# Patient Record
Sex: MALE | Race: WHITE
[De-identification: names, ages, dates, MRNs, and addresses within clinical notes are randomized per-mention and may not be internally consistent; named-entity substitution may affect disease eponyms.]

---

## 2017-01-05 ENCOUNTER — HOSPITAL ENCOUNTER (OUTPATIENT)
Age: 67
Discharge: HOME | End: 2017-01-05
Payer: COMMERCIAL

## 2017-01-05 DIAGNOSIS — R94.31: Primary | ICD-10-CM

## 2017-01-05 PROCEDURE — 78452 HT MUSCLE IMAGE SPECT MULT: CPT

## 2017-01-05 PROCEDURE — 93017 CV STRESS TEST TRACING ONLY: CPT

## 2017-01-10 ENCOUNTER — HOSPITAL ENCOUNTER (OUTPATIENT)
Age: 67
Discharge: HOME | End: 2017-01-10
Payer: COMMERCIAL

## 2017-01-10 DIAGNOSIS — Z20.5: ICD-10-CM

## 2017-01-10 DIAGNOSIS — E78.5: Primary | ICD-10-CM

## 2017-03-13 ENCOUNTER — HOSPITAL ENCOUNTER (OUTPATIENT)
Dept: HOSPITAL 76 - DI.S | Age: 67
Discharge: HOME | End: 2017-03-13
Attending: PHYSICIAN ASSISTANT
Payer: COMMERCIAL

## 2017-03-13 DIAGNOSIS — S22.42XA: Primary | ICD-10-CM

## 2017-03-13 PROCEDURE — 71020: CPT

## 2017-03-13 NOTE — XRAY REPORT
LEFT RIBS: 03/13/2017

 

CLINICAL INDICATION: Fall, pain. 

 

FINDINGS:  Oblique views of the left ribs demonstrate mildly displaced fractures of the anterior left
 6th and 7th ribs. No pneumothorax is evident. 

 

IMPRESSION:  MILDLY DISPLACED LEFT ANTERIOR 6TH AND 7TH RIB FRACTURES.

 

 

 

DD:03/13/2017 15:24:06  DT: 03/13/2017 15:29  JOB #: [id number]  EXT JOB #:[id number]

## 2017-03-14 NOTE — XRAY REPORT
TWO-VIEW CHEST:  03/13/2017

 

CLINICAL INDICATION:  Chest pain status post fall.

 

COMPARISON:  02/14/2015 

 

FINDINGS:  Frontal and lateral views of the chest demonstrate an enlarged cardiac silhouette.  Left b
asilar atelectasis is present.  No effusion or pneumothorax is seen.  Please also refer to left rib f
ilms of the same day. 

 

IMPRESSION:  LEFT BASILAR ATELECTASIS.  NO EVIDENCE OF PNEUMOTHORAX. 

 

 

 

DD:03/13/2017 15:22:55  DT: 03/13/2017 15:29  JOB #: [id number]  EXT JOB #:[id number]

## 2017-04-11 ENCOUNTER — HOSPITAL ENCOUNTER (OUTPATIENT)
Age: 67
Discharge: HOME | End: 2017-04-11
Payer: COMMERCIAL

## 2017-04-11 DIAGNOSIS — E78.00: Primary | ICD-10-CM

## 2018-05-30 ENCOUNTER — HOSPITAL ENCOUNTER (OUTPATIENT)
Dept: HOSPITAL 76 - LAB.F | Age: 68
Discharge: HOME | End: 2018-05-30
Attending: INTERNAL MEDICINE
Payer: MEDICARE

## 2018-05-30 DIAGNOSIS — E78.5: Primary | ICD-10-CM

## 2018-05-30 DIAGNOSIS — M10.9: ICD-10-CM

## 2018-05-30 LAB
ALBUMIN DIAFP-MCNC: 4.2 G/DL (ref 3.2–5.5)
ALBUMIN/GLOB SERPL: 1.4 {RATIO} (ref 1–2.2)
ALP SERPL-CCNC: 67 IU/L (ref 42–121)
ALT SERPL W P-5'-P-CCNC: 38 IU/L (ref 10–60)
ANION GAP SERPL CALCULATED.4IONS-SCNC: 7 MMOL/L (ref 6–13)
AST SERPL W P-5'-P-CCNC: 27 IU/L (ref 10–42)
BILIRUB BLD-MCNC: 1 MG/DL (ref 0.2–1)
BUN SERPL-MCNC: 18 MG/DL (ref 6–20)
CALCIUM UR-MCNC: 9.2 MG/DL (ref 8.5–10.3)
CHLORIDE SERPL-SCNC: 107 MMOL/L (ref 101–111)
CHOLEST SERPL-MCNC: 165 MG/DL
CO2 SERPL-SCNC: 23 MMOL/L (ref 21–32)
CREAT SERPLBLD-SCNC: 0.9 MG/DL (ref 0.6–1.2)
GFRSERPLBLD MDRD-ARVRAT: 84 ML/MIN/{1.73_M2} (ref 89–?)
GLOBULIN SER-MCNC: 3 G/DL (ref 2.1–4.2)
GLUCOSE SERPL-MCNC: 113 MG/DL (ref 70–100)
HDLC SERPL-MCNC: 41 MG/DL
HDLC SERPL: 4 {RATIO} (ref ?–5)
LDLC SERPL CALC-MCNC: 108 MG/DL
LDLC/HDLC SERPL: 2.6 {RATIO} (ref ?–3.6)
PROT SPEC-MCNC: 7.2 G/DL (ref 6.7–8.2)
SODIUM SERPLBLD-SCNC: 137 MMOL/L (ref 135–145)
URATE SERPL-MCNC: 7.9 MG/DL (ref 2.6–7.2)
VLDLC SERPL-SCNC: 16 MG/DL

## 2018-05-30 PROCEDURE — 83721 ASSAY OF BLOOD LIPOPROTEIN: CPT

## 2018-05-30 PROCEDURE — 80053 COMPREHEN METABOLIC PANEL: CPT

## 2018-05-30 PROCEDURE — 84550 ASSAY OF BLOOD/URIC ACID: CPT

## 2018-05-30 PROCEDURE — 80061 LIPID PANEL: CPT

## 2018-05-30 PROCEDURE — 36415 COLL VENOUS BLD VENIPUNCTURE: CPT

## 2018-12-07 ENCOUNTER — HOSPITAL ENCOUNTER (OUTPATIENT)
Dept: HOSPITAL 76 - LAB.F | Age: 68
Discharge: HOME | End: 2018-12-07
Attending: PHYSICIAN ASSISTANT
Payer: MEDICARE

## 2018-12-07 DIAGNOSIS — E78.5: Primary | ICD-10-CM

## 2018-12-07 DIAGNOSIS — R73.01: ICD-10-CM

## 2018-12-07 DIAGNOSIS — I10: ICD-10-CM

## 2018-12-07 LAB
ALBUMIN DIAFP-MCNC: 4.3 G/DL (ref 3.2–5.5)
ALBUMIN/GLOB SERPL: 1.3 {RATIO} (ref 1–2.2)
ALP SERPL-CCNC: 66 IU/L (ref 42–121)
ALT SERPL W P-5'-P-CCNC: 37 IU/L (ref 10–60)
ANION GAP SERPL CALCULATED.4IONS-SCNC: 6 MMOL/L (ref 6–13)
AST SERPL W P-5'-P-CCNC: 27 IU/L (ref 10–42)
BILIRUB BLD-MCNC: 0.8 MG/DL (ref 0.2–1)
BUN SERPL-MCNC: 17 MG/DL (ref 6–20)
CALCIUM UR-MCNC: 9.1 MG/DL (ref 8.5–10.3)
CHLORIDE SERPL-SCNC: 106 MMOL/L (ref 101–111)
CHOLEST SERPL-MCNC: 161 MG/DL
CO2 SERPL-SCNC: 25 MMOL/L (ref 21–32)
CREAT SERPLBLD-SCNC: 1 MG/DL (ref 0.6–1.2)
GFRSERPLBLD MDRD-ARVRAT: 75 ML/MIN/{1.73_M2} (ref 89–?)
GLOBULIN SER-MCNC: 3.4 G/DL (ref 2.1–4.2)
GLUCOSE SERPL-MCNC: 99 MG/DL (ref 70–100)
HDLC SERPL-MCNC: 47 MG/DL
HDLC SERPL: 3.4 {RATIO} (ref ?–5)
LDLC SERPL CALC-MCNC: 99 MG/DL
LDLC/HDLC SERPL: 2.1 {RATIO} (ref ?–3.6)
PROT SPEC-MCNC: 7.7 G/DL (ref 6.7–8.2)
SODIUM SERPLBLD-SCNC: 137 MMOL/L (ref 135–145)
VLDLC SERPL-SCNC: 15 MG/DL

## 2018-12-07 PROCEDURE — 83036 HEMOGLOBIN GLYCOSYLATED A1C: CPT

## 2018-12-07 PROCEDURE — 80053 COMPREHEN METABOLIC PANEL: CPT

## 2018-12-07 PROCEDURE — 80061 LIPID PANEL: CPT

## 2018-12-07 PROCEDURE — 83721 ASSAY OF BLOOD LIPOPROTEIN: CPT

## 2018-12-07 PROCEDURE — 36415 COLL VENOUS BLD VENIPUNCTURE: CPT

## 2018-12-08 LAB
EST. AVERAGE GLUCOSE BLD GHB EST-MCNC: 126 MG/DL (ref 70–100)
HB2 TOTAL: 14.6 G/DL
HBA1C BLD-MCNC: 0.61 G/DL
HEMOGLOBIN A1C %: 6 % (ref 4.6–6.2)

## 2020-01-08 ENCOUNTER — HOSPITAL ENCOUNTER (OUTPATIENT)
Dept: HOSPITAL 76 - LAB.S | Age: 70
Discharge: HOME | End: 2020-01-08
Attending: PHYSICIAN ASSISTANT
Payer: MEDICARE

## 2020-01-08 DIAGNOSIS — Z79.899: Primary | ICD-10-CM

## 2020-01-08 DIAGNOSIS — B35.4: ICD-10-CM

## 2020-01-08 LAB
ALBUMIN DIAFP-MCNC: 4.4 G/DL (ref 3.2–5.5)
ALP SERPL-CCNC: 62 IU/L (ref 42–121)
ALT SERPL W P-5'-P-CCNC: 38 IU/L (ref 10–60)
AST SERPL W P-5'-P-CCNC: 28 IU/L (ref 10–42)
BASOPHILS NFR BLD AUTO: 0.1 10^3/UL (ref 0–0.1)
BASOPHILS NFR BLD AUTO: 0.8 %
BILIRUB BLD-MCNC: 0.7 MG/DL (ref 0.2–1)
BILIRUB DIRECT SERPL-MCNC: 0.1 MG/DL (ref 0.1–0.5)
EOSINOPHIL # BLD AUTO: 0.2 10^3/UL (ref 0–0.7)
EOSINOPHIL NFR BLD AUTO: 2.8 %
ERYTHROCYTE [DISTWIDTH] IN BLOOD BY AUTOMATED COUNT: 12.4 % (ref 12–15)
GLOBULIN SER-MCNC: 3.1 G/DL (ref 2.1–4.2)
HGB UR QL STRIP: 14.3 G/DL (ref 14–18)
LYMPHOCYTES # SPEC AUTO: 1.8 10^3/UL (ref 1.5–3.5)
LYMPHOCYTES NFR BLD AUTO: 27 %
MCH RBC QN AUTO: 32.6 PG (ref 27–31)
MCHC RBC AUTO-ENTMCNC: 33.6 G/DL (ref 32–36)
MCV RBC AUTO: 97.3 FL (ref 80–94)
MONOCYTES # BLD AUTO: 0.7 10^3/UL (ref 0–1)
MONOCYTES NFR BLD AUTO: 10 %
NEUTROPHILS # BLD AUTO: 3.8 10^3/UL (ref 1.5–6.6)
NEUTROPHILS # SNV AUTO: 6.5 X10^3/UL (ref 4.8–10.8)
NEUTROPHILS NFR BLD AUTO: 58.8 %
PDW BLD AUTO: 10.3 FL (ref 7.4–11.4)
PLATELET # BLD: 275 10^3/UL (ref 130–450)
PROT SPEC-MCNC: 7.5 G/DL (ref 6.7–8.2)
RBC MAR: 4.38 10^6/UL (ref 4.7–6.1)

## 2020-01-08 PROCEDURE — 80076 HEPATIC FUNCTION PANEL: CPT

## 2020-01-08 PROCEDURE — 36415 COLL VENOUS BLD VENIPUNCTURE: CPT

## 2020-01-08 PROCEDURE — 85025 COMPLETE CBC W/AUTO DIFF WBC: CPT
